# Patient Record
Sex: FEMALE | Race: WHITE
[De-identification: names, ages, dates, MRNs, and addresses within clinical notes are randomized per-mention and may not be internally consistent; named-entity substitution may affect disease eponyms.]

---

## 2017-02-20 ENCOUNTER — HOSPITAL ENCOUNTER (OUTPATIENT)
Dept: HOSPITAL 45 - C.MRI | Age: 67
Discharge: HOME | End: 2017-02-20
Attending: ORTHOPAEDIC SURGERY
Payer: COMMERCIAL

## 2017-02-20 DIAGNOSIS — M75.101: ICD-10-CM
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
## 2017-02-20 DIAGNOSIS — S43.431A: Primary | ICD-10-CM

## 2017-02-20 DIAGNOSIS — M75.21: ICD-10-CM

## 2017-02-20 NOTE — DIAGNOSTIC IMAGING REPORT
RIGHT SHOULDER MRI



HISTORY:      RIGHT SHOULDER PAIN, BICEP TENDINITIS

Right



TECHNIQUE: Multiplanar multisequence MRI of the right shoulder was performed

without contrast.



COMPARISON STUDY:  Right shoulder MRI 12/10/2015.



FINDINGS: 



AC joint: Partially obscured by metallic artifact but appears to be grossly

intact.



Rotator cuff: Abnormal signal within the distal attachment of the supraspinatus

tendon consistent with a partial bursal surface tear. Best seen on coronal image

7 there is a linear full-thickness tear within the mid to distal supraspinatus

tendon. There is also a small area of increased signal within the distal

subscapularis tendon consistent with a partial tear. The infraspinatus and teres

minor tendons appear intact. Mild fatty atrophy of supraspinatus muscle. No

associated retraction. Trace fluid within the subacromial/subdeltoid bursa.



Labrum: Abnormal signal and configuration within the superior labrum consistent

with a SLAP tear.



Biceps tendon: Abnormal intrasubstance signal proximally consistent with a mild

tendinopathy.



Bones: No fracture or dislocation. Small amount of cystic change within the

posterior lateral humeral head.



Cartilage: Intact



Miscellaneous: No significant joint effusion.



IMPRESSION:  



1. Small linear full-thickness tear involving the mid to distal supraspinatus

tendon. There is also a partial bursal surface tear at the distal attachment of

the supraspinatus tendon.

2. Small partial tear of the distal subscapularis tendon.

3. SLAP tear.

4. Mild proximal biceps tendinopathy. 







Electronically signed by:  Monty Friend M.D.

2/20/2017 2:47 PM



Dictated Date/Time:  2/20/2017 2:38 PM

## 2017-02-27 ENCOUNTER — HOSPITAL ENCOUNTER (OUTPATIENT)
Dept: HOSPITAL 45 - C.RAD | Age: 67
Discharge: HOME | End: 2017-02-27
Attending: ORTHOPAEDIC SURGERY
Payer: COMMERCIAL

## 2017-02-27 DIAGNOSIS — Z01.818: Primary | ICD-10-CM

## 2017-02-27 DIAGNOSIS — M75.21: ICD-10-CM

## 2017-02-27 LAB
ANION GAP SERPL CALC-SCNC: 8 MMOL/L (ref 3–11)
BASOPHILS # BLD: 0.02 K/UL (ref 0–0.2)
BASOPHILS NFR BLD: 0.3 %
BUN SERPL-MCNC: 18 MG/DL (ref 7–18)
BUN/CREAT SERPL: 21.3 (ref 10–20)
CALCIUM SERPL-MCNC: 8.9 MG/DL (ref 8.5–10.1)
CHLORIDE SERPL-SCNC: 99 MMOL/L (ref 98–107)
CO2 SERPL-SCNC: 31 MMOL/L (ref 21–32)
COMPLETE: YES
CREAT SERPL-MCNC: 0.85 MG/DL (ref 0.6–1.2)
EOSINOPHIL NFR BLD AUTO: 298 K/UL (ref 130–400)
GLUCOSE SERPL-MCNC: 87 MG/DL (ref 70–99)
HCT VFR BLD CALC: 41.3 % (ref 37–47)
IG%: 0.2 %
IMM GRANULOCYTES NFR BLD AUTO: 29.8 %
LYMPHOCYTES # BLD: 1.91 K/UL (ref 1.2–3.4)
MCH RBC QN AUTO: 32.4 PG (ref 25–34)
MCHC RBC AUTO-ENTMCNC: 34.4 G/DL (ref 32–36)
MCV RBC AUTO: 94.3 FL (ref 80–100)
MONOCYTES NFR BLD: 10.9 %
NEUTROPHILS # BLD AUTO: 0.8 %
NEUTROPHILS NFR BLD AUTO: 58 %
PMV BLD AUTO: 9.4 FL (ref 7.4–10.4)
POTASSIUM SERPL-SCNC: 3.8 MMOL/L (ref 3.5–5.1)
RBC # BLD AUTO: 4.38 M/UL (ref 4.2–5.4)
SODIUM SERPL-SCNC: 138 MMOL/L (ref 136–145)
WBC # BLD AUTO: 6.42 K/UL (ref 4.8–10.8)

## 2017-02-27 NOTE — DIAGNOSTIC IMAGING REPORT
CHEST 2 VIEWS ROUTINE



CLINICAL HISTORY: M75.21 BICEPS TENDINITIS preoperative evaluation



COMPARISON STUDY:  5/5/2015



FINDINGS: The bones soft tissues and hemidiaphragms are normal. The

cardiomediastinal silhouette is normal. The lungs are clear. The pulmonary

vasculature is normal. 



IMPRESSION:  Negative chest. 









Electronically signed by:  Toro Villarreal M.D.

2/27/2017 3:32 PM



Dictated Date/Time:  2/27/2017 3:31 PM

## 2017-03-23 ENCOUNTER — HOSPITAL ENCOUNTER (OUTPATIENT)
Dept: HOSPITAL 45 - X.SURG | Age: 67
Discharge: HOME | End: 2017-03-23
Attending: ORTHOPAEDIC SURGERY
Payer: COMMERCIAL

## 2017-03-23 VITALS
WEIGHT: 116.25 LBS | HEIGHT: 60.98 IN | WEIGHT: 116.25 LBS | HEIGHT: 60.98 IN | BODY MASS INDEX: 21.95 KG/M2 | BODY MASS INDEX: 21.95 KG/M2

## 2017-03-23 VITALS — SYSTOLIC BLOOD PRESSURE: 133 MMHG | DIASTOLIC BLOOD PRESSURE: 71 MMHG | OXYGEN SATURATION: 99 %

## 2017-03-23 VITALS — TEMPERATURE: 97.16 F

## 2017-03-23 DIAGNOSIS — Z88.1: ICD-10-CM

## 2017-03-23 DIAGNOSIS — Z98.890: ICD-10-CM

## 2017-03-23 DIAGNOSIS — F17.210: ICD-10-CM

## 2017-03-23 DIAGNOSIS — M75.101: Primary | ICD-10-CM

## 2017-03-23 DIAGNOSIS — K21.9: ICD-10-CM

## 2017-03-23 DIAGNOSIS — M75.21: ICD-10-CM

## 2017-03-23 DIAGNOSIS — Z88.2: ICD-10-CM

## 2017-03-23 DIAGNOSIS — Z98.51: ICD-10-CM

## 2017-03-23 NOTE — OPERATIVE REPORT
DATE OF OPERATION:  03/23/2017

 

PREOPERATIVE DIAGNOSIS:  Partial thickness cuff tear of the right shoulder 1

year status post right shoulder arthroscopy.

 

POSTOPERATIVE DIAGNOSIS:  Same.

 

PROCEDURE:  Right shoulder diagnostic arthroscopy with extensive debridement

including removal of scar tissue, acromioplasty, small far anterior rotator

cuff repair and biceps tenotomy.

 

SURGEON:  Dr. Leroy Jaime.

 

ASSISTANT:  Armin Gomez PA-C, whose assistance was necessary for positioning

of the arm and helping with instrumentation.

 

ANESTHESIA:  General with a right interscalene nerve block.

 

COMPLICATIONS:  None.

 

CONDITION:  Stable to PACU.

 

INDICATIONS:  Sanna is a pleasant 66-year-old female who underwent a right

shoulder arthroscopy at an outside institution a year ago.  She had a simple

acromioplasty at that time.  Unfortunately, she never did better after

surgery and continued to have pain.  Repeat MRI showed a high grade partial

thickness cuff tear and biceps tendinopathy.  After failing conservative

treatment, she elected to undergo arthroscopy.

 

DESCRIPTION OF PROCEDURE:  On 03/23/2017, she arrived at [facility] for the above procedure.  She was seen in the preoperative

holding area and the operative extremity was identified and signed.  She was

given a preoperative antibiotic and a right interscalene nerve block.  She

was taken back to the operating room, laid on the table in supine position

and put under general anesthesia.  She was then put into the beachchair

position.  The right shoulder was prepped and draped in sterile fashion. 

Time-out was done and the patient and operative extremity was properly

identified.

 

A scope was introduced in the posterior portal.  Diagnostic arthroscopy

showed no cartilage damage to the humeral head or the glenoid.  There was

some fraying of the anterior labrum.  There was some tightness of the rotator

interval.  The biceps tendon went through an enlarged biceps pulley mechanism

and was read on the dorsal aspect.  There was a high grade partial thickness

cuff tear of the far anterior supraspinatus and the remaining tissue did not

look very good.  The subscapularis, infraspinatus and teres minor were all

checked and intact.  An anterior portal was made, a shaver was used to do a

limited debridement of the intraarticular structures and the biceps tendon

was arthroscopically tenotomized.  The scope was put into the subacromial

space.  A lateral portal was made.  A shaver was used to do a complete

subacromial and subdeltoid bursectomy.  An ablator was used to tease the

coracoacromial ligament off the undersurface of the acromion and a shaver was

used to do a revision acromioplasty to smooth out the undersurface of the

acromion.  Extensive debridement was done all the scar tissue in the

subacromial space and hemostasis was controlled with an ablator.  The bursal

side of the rotator cuff was examined extensively.  The rotator cuff tear was

easily completed.  An additional anterolateral portal was made and Lea

cannula was placed.  The greater tuberosity was prepared with a ring curette

and a microfracture.  The rotator cuff was fixed with an Arthrex modified

SpeedBridge configuration with a single 4.75 mm BioComposite SwiveLock suture

anchor, placed along the medial row, 2 Fiber tapes were passed through the

tendon brought down to a single lateral row SwiveLock suture anchor.  This

gave a nice knotless SpeedBridge repair.  A Fiber link was attached to the

anterior leading edge of the tear and brought to do a single 4.75 mm

BioComposite SwiveLock suture anchor, which was placed in the area of the

bicipital groove.  This gave an anatomical fixation.  Multiple pictures were

taken.  The scope was placed back into the glenohumeral joint and the

articular margin of the rotator cuff had been restored.  Multiple pictures

were taken.  Arthroscopic instruments were removed from the shoulder.  Portal

sites were closed with 3-0 nylon.  She was then placed in a soft dressing and

an abduction arm sling.  She was then extubated, transferred to a litter and

taken to the postanesthesia care unit in stable condition.  She tolerated the

procedure well.

 

 

I attest to the content of the Intraoperative Record and any orders documented therein. Any exceptio
ns are noted below.

## 2017-03-23 NOTE — [facility] POST OPERATIVE BRIEF NOTE
Immediate Operative Summary


Operative Date


Mar 23, 2017.





Pre-Operative Diagnosis





Right Shoulder Biceps Tendinitis





Post-Operative Diagnosis





Same





Procedure(s) Performed





Right Shoulder Arthroscopy, Small Rotator Cuff Repair, Extensive Debridement, 


Biceps Tenotomy





Surgeon


Dr. Jaime





Assistant Surgeon(s)


AMANDA Gomez PA-C





Estimated Blood Loss


Minimal





Findings


as above





Specimens





None





Complication(s)


None





Disposition


Recovery Room / PACU

## 2017-03-23 NOTE — DISCHARGE INSTRUCTIONS-[facility]
Discharge Instructions


Date of Service


Mar 23, 2017.





Visit


Reason for Visit:  Right Shoulder Biceps Tendinitis





Discharge


Discharge Diagnosis / Problem:  SAME AS ABOVE





Discharge Goals


Goal(s):  Decrease discomfort, Improve function





Medications


Stopped Medications Name(s):  


HELD IBUPROFEN FOR ONE WEEK


Restart Stopped Medication(s):


MAY RESTART ONCE FINISHED WITH THE TORADOL





Activity Recommendations


Activity Limitations:  as noted below


Lifting Limitations:  until after follow-up appointment


Exercise/Sports Limitations:  until after follow-up appointment


Shower/Bathe:  tomorrow





Anesthesia


.





Post Anesthesia Instructions:





If you have had General Anesthesia or IV Sedation:





*  Do not drive today.


*  Resume driving when surgeon permits.


*  Do not make important decisions or sign legal documents today.


*  Call surgeon for:





   1.  Temperature elevations greater than 101 degrees F.


   2.  Uncontrollable pain.


   3.  Excessive bleeding.


   4.  Persistent nausea and vomiting.


   5.  Medication intolerance (nausea, vomiting or rash).





*  For nausea and vomiting use only clear liquids such as: tea, soda, bouillon 

until nausea subsides, then gradually increase diet as tolerated.





*  If you have any concerns or questions, call your surgeon's office.  If 

physician is unavailable and it is an emergency, call 911 or go to the nearest 

emergency room.





.





Instructions / Follow-Up


Instructions / Follow-Up





MEDICATIONS:





*  Resume previous medications unless instructed otherwise by your surgeon.





*  Always take pain medication on a full stomach or with food to avoid upset 

stomach. 





*  Do not drink alcohol or drive while taking narcotics.





*  Ibuprofen or Tylenol may be taken if narcotic not needed.








SPECIAL CARE INSTRUCTIONS:





__ None





_X_ Keep extremity elevated and iced x 48 hours; apply ice 20-30 


    minutes 8-10 times/day. May remove at night.





_X_ Sling (MAY REMOVE AFTER 48 HOURS ONLY TO SHOWER AND FOR THERAPY) 


    _X_24 hrs/day


    __ Remove at night





__ Shoulder Immobilizer


    __ 24 hrs/day


    __ Remove at night





_X_ Dressing


    __ Maintain until seen in office, may shower with plastic over site


    _X_ Remove dressings in 24-48 hours and then may shower


    _X_ Cover incisions with band-aids after showering


    __ Do not remove steri-strips





Call physician if chills or temperature rises above 102 degrees or pain


unrelieved by prescribed pain medications at [phone].


.





Diet Recommendations


Home Diet:  no limitations


Fluid Restriction:  None





Procedures


Procedures Performed:  


Right Shoulder Arthroscopy, Small Rotator Cuff Repair, Extensive Debridement, 


Biceps Tenotomy





Pending Studies


Studies pending at discharge:  no





Work Instructions


Return To Work:  after follow-up


Lifting Limitations:  NO LIFTING WITH RIGHT ARM





Medical Emergencies








.


Who to Call and When:





Medical Emergencies:  If at any time you feel your situation is an emergency, 

please call 911 immediately.





.





Non-Emergent Contact


Non-Emergency issues call your:  Primary Care Provider


Call Non-Emergent contact if:  you have a fever, temperature is above 101, 

temperature is above 101.5





.


.





"Provider Documentation" section prepared by Abhi Gomez.

## 2017-03-23 NOTE — ANESTHESIA PROGRESS NT - [facility]
Anesthesia Post Op Note


Date & Time


Mar 23, 2017 at 14:29





Vital Signs


Pain Intensity:  0





 Vital Signs Past 12 Hours








  Date Time  Temp Pulse Resp B/P Pulse Ox O2 Delivery O2 Flow Rate FiO2


 


3/23/17 14:19 36.2 83 18 138/72 98 Room Air  


 


3/23/17 13:46  81 14     


 


3/23/17 13:46  79 14  98   


 


3/23/17 13:44    153/90    


 


3/23/17 13:42 36.1 81 20 153/90 98 Room Air  


 


3/23/17 13:41  81 18     


 


3/23/17 13:41  81 18 143/82 100   


 


3/23/17 13:40    138/68    


 


3/23/17 13:36  75 13  100   


 


3/23/17 13:36  76 13     


 


3/23/17 13:35    130/68    


 


3/23/17 13:31  82 15  100   


 


3/23/17 13:31  81 15     


 


3/23/17 13:30    132/69    


 


3/23/17 13:26  74 15     


 


3/23/17 13:26  74 15  100   


 


3/23/17 13:25    137/71    


 


3/23/17 13:21  73 16     


 


3/23/17 13:21  74 16  100   


 


3/23/17 13:20    132/64    


 


3/23/17 13:16  74 15  100   


 


3/23/17 13:16  74 15     


 


3/23/17 13:15    134/69    


 


3/23/17 13:11  78 27  100   


 


3/23/17 13:11  78 27     


 


3/23/17 13:10    132/73    


 


3/23/17 13:06  76 17     


 


3/23/17 13:06  75 17  100   


 


3/23/17 13:05    136/64    


 


3/23/17 13:03 36.3 77 16 145/64 95 Diffusion Mask 6 


 


3/23/17 13:02    145/64    


 


3/23/17 11:56   0     


 


3/23/17 11:51   0     


 


3/23/17 11:50   0     


 


3/23/17 11:45   0     


 


3/23/17 11:40  76  122/70 100   


 


3/23/17 11:40  76      


 


3/23/17 11:35  73 0 130/66 97   


 


3/23/17 11:35  74      


 


3/23/17 11:30  74 0  98   


 


3/23/17 11:30  73      


 


3/23/17 11:25  74      


 


3/23/17 11:25  74 0  98   


 


3/23/17 10:19 37.9 83 16 114/83 98 Room Air  











Notes


Mental Status:  alert / awake / arousable, participated in evaluation


Pt Amnestic to Procedure:  Yes


Nausea / Vomiting:  adequately controlled


Pain:  adequately controlled


Airway Patency, RR, SpO2:  stable & adequate


BP & HR:  stable & adequate


Hydration State:  stable & adequate


Anesthetic Complications:  no major complications apparent

## 2017-12-14 ENCOUNTER — HOSPITAL ENCOUNTER (OUTPATIENT)
Dept: HOSPITAL 45 - C.MAMM | Age: 67
Discharge: HOME | End: 2017-12-14
Attending: FAMILY MEDICINE
Payer: COMMERCIAL

## 2017-12-14 DIAGNOSIS — Z12.31: Primary | ICD-10-CM

## 2017-12-15 NOTE — MAMMOGRAPHY REPORT
BILATERAL DIGITAL SCREENING MAMMOGRAM TOMOSYNTHESIS WITH CAD: 12/14/2017

CLINICAL HISTORY: Routine screening.  Patient has no complaints.  





TECHNIQUE:  Breast tomosynthesis in addition to standard 2D mammography was performed. Current study 
was also evaluated with a Computer Aided Detection (CAD) system.  



COMPARISON: Comparison is made to exams dated:  11/10/2016 mammogram, 11/9/2015 mammogram, 9/11/2014 
mammogram, 9/5/2013 mammogram, 9/4/2012 mammogram, and 9/1/2011 mammogram - [facility].   



BREAST COMPOSITION:  The tissue of both breasts is almost entirely fatty.  



FINDINGS:  No suspicious masses, calcifications, or areas of architectural distortion are noted in ei
ther breast. There has been no significant interval change compared to prior exams. Scattered bilater
al benign-appearing calcifications are not significantly changed.  





IMPRESSION:  ACR BI-RADS CATEGORY 2: BENIGN

There is no mammographic evidence of malignancy. A 1 year screening mammogram is recommended.  The pa
tient will receive written notification of the results.  





Approximately 10% of breast cancers are not detected with mammography. A negative mammographic report
 should not delay biopsy if a clinically suggestive mass is present.



Cori Pulliam M.D.          

/:12/14/2017 14:40:14  



Imaging Technologist: Aye MEDRANO(LAURA)(M), [facility]

letter sent: Normal 1/2  

BI-RADS Code: ACR BI-RADS Category 2: Benign

## 2018-02-16 ENCOUNTER — HOSPITAL ENCOUNTER (EMERGENCY)
Dept: HOSPITAL 45 - C.EDB | Age: 68
Discharge: HOME | End: 2018-02-16
Payer: COMMERCIAL

## 2018-02-16 VITALS
HEIGHT: 60.98 IN | BODY MASS INDEX: 22.89 KG/M2 | HEIGHT: 60.98 IN | BODY MASS INDEX: 22.89 KG/M2 | WEIGHT: 121.25 LBS | WEIGHT: 121.25 LBS

## 2018-02-16 VITALS — TEMPERATURE: 97.7 F

## 2018-02-16 VITALS — HEART RATE: 60 BPM | SYSTOLIC BLOOD PRESSURE: 133 MMHG | OXYGEN SATURATION: 98 % | DIASTOLIC BLOOD PRESSURE: 70 MMHG

## 2018-02-16 DIAGNOSIS — Z87.891: ICD-10-CM

## 2018-02-16 DIAGNOSIS — M79.602: Primary | ICD-10-CM

## 2018-02-16 DIAGNOSIS — M54.12: ICD-10-CM

## 2018-02-16 LAB
BASOPHILS # BLD: 0.01 K/UL (ref 0–0.2)
BASOPHILS NFR BLD: 0.2 %
BUN SERPL-MCNC: 19 MG/DL (ref 7–18)
CALCIUM SERPL-MCNC: 9.5 MG/DL (ref 8.5–10.1)
CO2 SERPL-SCNC: 29 MMOL/L (ref 21–32)
CREAT SERPL-MCNC: 0.94 MG/DL (ref 0.6–1.2)
EOS ABS #: 0.03 K/UL (ref 0–0.5)
EOSINOPHIL NFR BLD AUTO: 265 K/UL (ref 130–400)
GLUCOSE SERPL-MCNC: 65 MG/DL (ref 70–99)
HCT VFR BLD CALC: 39.2 % (ref 37–47)
HGB BLD-MCNC: 13.3 G/DL (ref 12–16)
IG#: 0 K/UL (ref 0–0.02)
IMM GRANULOCYTES NFR BLD AUTO: 40.2 %
LYMPHOCYTES # BLD: 2 K/UL (ref 1.2–3.4)
MCH RBC QN AUTO: 32.4 PG (ref 25–34)
MCHC RBC AUTO-ENTMCNC: 33.9 G/DL (ref 32–36)
MCV RBC AUTO: 95.6 FL (ref 80–100)
MONO ABS #: 0.57 K/UL (ref 0.11–0.59)
MONOCYTES NFR BLD: 11.5 %
NEUT ABS #: 2.36 K/UL (ref 1.4–6.5)
NEUTROPHILS # BLD AUTO: 0.6 %
NEUTROPHILS NFR BLD AUTO: 47.5 %
PMV BLD AUTO: 8.7 FL (ref 7.4–10.4)
POTASSIUM SERPL-SCNC: 4.2 MMOL/L (ref 3.5–5.1)
RED CELL DISTRIBUTION WIDTH CV: 13.2 % (ref 11.5–14.5)
RED CELL DISTRIBUTION WIDTH SD: 45.8 FL (ref 36.4–46.3)
SODIUM SERPL-SCNC: 140 MMOL/L (ref 136–145)
WBC # BLD AUTO: 4.97 K/UL (ref 4.8–10.8)

## 2018-02-16 NOTE — EMERGENCY ROOM VISIT NOTE
ED Visit Note


First contact with patient:  16:23


This Patient was discussed with the physician assistant, Tho Cabrales PA-C.  

The pertinent historical and physical exam findings were confirmed.  I agree 

with the studies ordered and with the interpretations of these studies.  I 

agree with the disposition and care plan.

## 2018-02-16 NOTE — DIAGNOSTIC IMAGING REPORT
CERVICAL SPINE 5 VIEWS



HISTORY:      Neck pain with pain down L arm



COMPARISON: None.



FINDINGS: The cervical spine is visualized from C1 through the superior endplate

of T1. There is no fracture.  There is 1.5 mm of anterolisthesis of C3 on C4.

Moderate facet osteoarthritis at C3-C4 and C7-T1. Disc spaces are preserved.

Prevertebral soft tissues and the atlantodens interval are intact. No

significant neural foraminal narrowing. The lung apices are clear.



IMPRESSION:  



1. No fractures within the cervical spine.

2. C3-C4 and C7-T1 moderate facet osteoarthritis.

3. Minimal anterolisthesis of C3 on C4.







Electronically signed by:  Monty Friend M.D.

2/16/2018 6:24 PM



Dictated Date/Time:  2/16/2018 6:20 PM

## 2018-02-16 NOTE — DIAGNOSTIC IMAGING REPORT
LEFT UPPER EXTREMITY VENOUS DOPPLER



HISTORY:      L arm pain



COMPARISON STUDY:  None.



FINDINGS: The left internal jugular vein is patent. There is normal flow within

the left subclavian vein. There is normal flow and compressibility within the

left axillary, basilic, brachial, radial, ulnar, and visualized cephalic veins. 



IMPRESSION:  

No DVT within the left upper extremity. 







Electronically signed by:  Monty Friend M.D.

2/16/2018 6:08 PM



Dictated Date/Time:  2/16/2018 6:08 PM

## 2018-02-16 NOTE — EMERGENCY ROOM VISIT NOTE
History


First contact with patient:  16:23


Chief Complaint:  ARM PAIN


Stated Complaint:  PAIN DOWN MY LEFT ARM





History of Present Illness


The patient is a 67 year old female who presents to the Emergency Room via 

private vehicle with complaints of "pain down my left arm".  The patient notes 

that she has been experiencing left arm pain radiating from her left neck down 

to her fingertips 1 month.  There is no known injury.  She states that she saw 

her family doctor this past week and was informed this is likely a pinched nerve

, and has a scheduled follow-up with Dr. Chao of neurology in about a month 

and a half.  She notes that now the pain is worsening, she could not sleep last 

night and decided to be evaluated.  She denies with this any fevers, chills, 

chest pain or shortness of breath.  She does note associated headaches from 

time to time.





Review of Systems


A complete 6-point Review of Systems was discussed with the patient, with 

pertinent positives and negatives listed in the History of Present Illness. All 

remaining Review of Systems questions can be considered negative unless 

otherwise specified.





Past Medical/Surgical History


Medical Problems:


(1) Chronic Sinusitis Nos


(2) Diverticulosis Colon (W/O Ment Of Hemorrhage)


(3) Lumbago


(4) Ovarian Cyst Nec/Nos


(5) Overactive bladder








Family History





No pertinent family history





Social History


Smoking Status:  Former Smoker


Alcohol Use:  occasionally


Marital Status:  


Occupation Status:  retired





Current/Historical Medications


Scheduled


Ascorbic Acid (Ascorbic Acid), 500 MG PO QAM


Cholecalciferol (D 2000), 1 TAB PO QAM


Fluticasone Propionate (Nasal) (Flonase Allergy Relief), 2 SPRAYS MCKAYLA QAM


Glucosamine-Msm-Vit C-Manganes (Glucosamine Msm Complex), 1 TAB PO QAM


Magnesium Oxide (Mag-Ox), 400 MG PO DAILY


Methylprednisolone (Medrol Dosepak), 0 PO DAILY


Montelukast Sodium (Singulair), 10 MG PO HS


Multivitamin (Multivitamin), 1 TAB PO QAM


Omeprazole (Prilosec), 40 MG PO QAM


Oxybutynin Chloride (Oxybutynin Chloride Er), 5 MG PO DAILY


Potassium Gluconate (Potassium Gluconate), 1 TAB PO QAM


Riboflavin (Riboflavin), 400 MG PO DAILY





Scheduled PRN


Acetaminophen (Tylenol), 1,000 MG PO Q6 PRN for Pain


Calcium Carbonate (Tums), 1 TAB PO DAILY PRN for Indigestion


Ibuprofen Tab (Motrin), 800 MG PO DAILY PRN for Pain


Lorazepam (Ativan), 1 MG PO BID PRN for Anxiety


Sumatriptan Succinate (Imitrex), 1 TAB PO UD PRN for Migraine


Tramadol (Ultram), 1 TAB PO TID PRN for pain





Physical Exam


Vital Signs











  Date Time  Temp Pulse Resp B/P (MAP) Pulse Ox O2 Delivery O2 Flow Rate FiO2


 


2/16/18 18:37  60 16 133/70 98 Room Air  


 


2/16/18 16:30  72 16 145/75 96 Room Air  


 


2/16/18 14:31 36.5 87 16 121/70 95 Room Air  











Physical Exam


VITAL SIGNS - Vital signs and nursing notes were reviewed.  Stable.


GENERAL -67-year-old female appearing her stated age who is in no acute 

distress. Communicates well with provider and answers questions appropriately.


SKIN - Without rashes.  No petechial rashes.


NECK - Neck with FROM.  No meningismus.  There is superior trapezius tenderness 

noted to the left.


LUNGS - Chest wall symmetric without accessory muscle use, intercostals 

retractions, or central cyanosis. Normal vesicular breath sounds CTA B/L. No 

wheezes, rales, or rhonchi appreciated.


CARDIAC - RRR with S1/S2. No murmur, rubs, or gallops appreciated. 


EXTREMITIES - No clubbing or peripheral cyanosis. No pretibial edema present.  

She is neurovascularly intact in left upper extremity.  Tenderness to palpation 

overlying the proximal left arm.  There is also tenderness in the left superior 

trapezius muscle.  Excellent reflex noted in the biceps region of the left arm.

   strength intact.  No neurovascular deficits appreciated in left upper 

extremity.





Medical Decision & Procedures


ER Provider


Diagnostic Interpretation:


CERVICAL SPINE 5 VIEWS





HISTORY:      Neck pain with pain down L arm





COMPARISON: None.





FINDINGS: The cervical spine is visualized from C1 through the superior endplate


of T1. There is no fracture.  There is 1.5 mm of anterolisthesis of C3 on C4.


Moderate facet osteoarthritis at C3-C4 and C7-T1. Disc spaces are preserved.


Prevertebral soft tissues and the atlantodens interval are intact. No


significant neural foraminal narrowing. The lung apices are clear.





IMPRESSION:  





1. No fractures within the cervical spine.


2. C3-C4 and C7-T1 moderate facet osteoarthritis.


3. Minimal anterolisthesis of C3 on C4.











Electronically signed by:  Monty Friend M.D.


2/16/2018 6:24 PM





Dictated Date/Time:  2/16/2018 6:20 PM





Laboratory Results


2/16/18 16:39








Red Blood Count 4.10, Mean Corpuscular Volume 95.6, Mean Corpuscular Hemoglobin 

32.4, Mean Corpuscular Hemoglobin Concent 33.9, Mean Platelet Volume 8.7, 

Neutrophils (%) (Auto) 47.5, Lymphocytes (%) (Auto) 40.2, Monocytes (%) (Auto) 

11.5, Eosinophils (%) (Auto) 0.6, Basophils (%) (Auto) 0.2, Neutrophils # (Auto

) 2.36, Lymphocytes # (Auto) 2.00, Monocytes # (Auto) 0.57, Eosinophils # (Auto

) 0.03, Basophils # (Auto) 0.01





2/16/18 16:39

















Test


  2/16/18


16:39


 


White Blood Count


  4.97 K/uL


(4.8-10.8)


 


Red Blood Count


  4.10 M/uL


(4.2-5.4)


 


Hemoglobin


  13.3 g/dL


(12.0-16.0)


 


Hematocrit 39.2 % (37-47) 


 


Mean Corpuscular Volume


  95.6 fL


()


 


Mean Corpuscular Hemoglobin


  32.4 pg


(25-34)


 


Mean Corpuscular Hemoglobin


Concent 33.9 g/dl


(32-36)


 


Platelet Count


  265 K/uL


(130-400)


 


Mean Platelet Volume


  8.7 fL


(7.4-10.4)


 


Neutrophils (%) (Auto) 47.5 % 


 


Lymphocytes (%) (Auto) 40.2 % 


 


Monocytes (%) (Auto) 11.5 % 


 


Eosinophils (%) (Auto) 0.6 % 


 


Basophils (%) (Auto) 0.2 % 


 


Neutrophils # (Auto)


  2.36 K/uL


(1.4-6.5)


 


Lymphocytes # (Auto)


  2.00 K/uL


(1.2-3.4)


 


Monocytes # (Auto)


  0.57 K/uL


(0.11-0.59)


 


Eosinophils # (Auto)


  0.03 K/uL


(0-0.5)


 


Basophils # (Auto)


  0.01 K/uL


(0-0.2)


 


RDW Standard Deviation


  45.8 fL


(36.4-46.3)


 


RDW Coefficient of Variation


  13.2 %


(11.5-14.5)


 


Immature Granulocyte % (Auto) 0.0 % 


 


Immature Granulocyte # (Auto)


  0.00 K/uL


(0.00-0.02)


 


Erythrocyte Sedimentation Rate 3 mm/hr (0-21) 


 


Anion Gap


  7.0 mmol/L


(3-11)


 


Est Creatinine Clear Calc


Drug Dose 43.8 ml/min 


 


 


Estimated GFR (


American) 72.8 


 


 


Estimated GFR (Non-


American 62.8 


 


 


BUN/Creatinine Ratio 20.2 (10-20) 


 


Calcium Level


  9.5 mg/dl


(8.5-10.1)


 


Troponin I


  < 0.015 ng/ml


(0-0.045)


 


C-Reactive Protein


  < 0.29 mg/dl


(0-0.29)











Medications Administered











 Medications


  (Trade)  Dose


 Ordered  Sig/Ketan


 Route  Start Time


 Stop Time Status Last Admin


Dose Admin


 


 Morphine Sulfate


  (MoRPHine


 SULFATE INJ)  4 mg  NOW  STAT


 IV  2/16/18 16:29


 2/16/18 16:31 DC 2/16/18 16:50


4 MG


 


 Ondansetron HCl


  (Zofran Inj)  4 mg  NOW  STAT


 IV  2/16/18 16:29


 2/16/18 16:31 DC 2/16/18 16:50


4 MG


 


 Tramadol HCl


  (Ultram Home


 Pack)  1 homepack  UD  STAT


 PO  2/16/18 18:49


 2/16/18 18:51 DC 2/16/18 19:03


1 HOMEPACK


 


 Dexamethasone


 Sodium Phosphate


  (Dexamethasone


 Inj **Pf**)  10 mg  STK-MED ONCE


 .ROUTE  2/16/18 18:59


 2/16/18 19:00 DC 2/16/18 19:03


10 MG











Medical Decision


Patient was seen and evaluated as above.  She presents to us today with pain 

traveling from the left side of her neck down to the left fingers.  This is not 

worse with exertion.  I do not suspect any cardiac etiology.  It is 

reproducible on exam.  I favor she likely is experiencing cervical 

radiculopathy.  Vital signs are stable.  X-ray was obtained of the cervical 

spine and ultrasound was obtained of the left upper extremity to rule out DVT.  

There is no upper extremity DVT.  X-ray does reveal some degenerative change 

with certainty can contribute.  I do not believe that at this time an MRI is 

warranted in the emergency department setting.  I believe the treatment with 

steroids, and pain medication is appropriate with close follow-up with the 

family doctor  and with a spine specialist and perhaps neurology.  Case was 

also discussed with the attending physician who also personally evaluated the 

patient.  She will be discharged home on a Medrol Dosepak after an IV dose of 

steroids here today, as well as tramadol.  She has many allergies listed, has 

never had tramadol and I cautioned her that if she experiences a reaction she 

is to return.  While here she was given morphine for pain.  Her pain was 

alleviated.  She was educated that she likely is experiencing a small bulging 

disc in the neck/pinched nerve.  I informed her that this time she appears 

stable for outpatient management.  She was educated upon management, educated 

upon worrisome symptoms in which to return, had questions answered prior to 

discharge, and was discharged home in good condition.





Medication list reviewed.





Blood pressure found to be only slightly elevated I believe secondary to 

situation.





In the evaluation and treatment of this patient, the following differential 

diagnoses were considered: Musculoskeletal Strain, Discitis, Cervical Spine 

Fracture, Cervical Spine Dislocation, Cervical Spine Subluxation, Cervical 

Spondylosis, Fibromyalgia, Osteoarthritis, Polymyalgia Rheumatica, Psychogenic 

Pain Disorder, Tumor of Soft Tissue or Spine.





Impression





 Primary Impression:  


 Arm pain, left


 Additional Impression:  


 Cervical radiculopathy





Departure Information


Dispostion


Home / Self-Care





Condition


GOOD





Prescriptions





Methylprednisolone (MEDROL DOSEPAK) 4 Mg Dewayne


0 PO DAILY, #1 PKT


   Prov: Tho Cabrales PA-C         2/16/18 


Tramadol (Ultram) 50 Mg Tab


1 TAB PO TID Y for pain , #9 TAB


   for initial treatment


   Prov: Tho Cabrales PA-C         2/16/18





Referrals


Federico Diallo M.D. (PCP)








Rohan Caballero, DO





Patient Instructions


My [facility]





Additional Instructions





You have been treated in the Emergency Department for neck Pain. You have 

received pain medicine in the emergency department which impairs your ability 

to operate a vehicle. It is illegal for you to drive after receiving these 

medicines. 





You have been prescribed tramadol to be used for pain control. This is a 

narcotic medication. You cannot drive or consume alcohol while on this 

medicine. This medicine should only be used for pain that cannot be controlled 

with over-the-counter pain medicines.





You have been prescribed a Medrol Dosepak. Take this medication as prescribed. 

You should take the COMPLETE 6-day course of this medication. This is an anti-

inflammatory medicine that will help to minimize your symptoms.





For pain control, you can use the following over-the-counter medicines (if >13 yo):





- Regular strength (325mg/tab) Tylenol (acetaminophen) 2 tabs every 4-6 hours 

as needed. Do not exceed 12 tablets in a 24 hour period. Avoid taking more than 

3 grams (3000 mg) of Tylenol per day. This includes any other sources of 

acetaminophen you may take on a regular basis.





- Regular strength (200 mg/tab) Advil (ibuprofen) 1-2 tabs every 4-6 hours as 

needed. Do not exceed a dose of 3200 mg per day.





If this is an acute injury, ice can be applied to the area of pain for the 

first 3 days to help decrease pain and inflammation. After the first 3 days, a 

heating pad can be used over the area for continued soothing relief.





You should schedule a follow-up appointment in 2-3 days with your Primary Care 

Provider and Dr. Caballero, spine specialist for further evaluation and treatment 

of your neck pain.





Return to the Emergency Department if your current symptoms worsen despite 

treatment course outlined above, or if you develop any of the following symptoms

: intractable pain despite aforementioned treatment course, loss of control of 

your bowel or bladder, numbness or tingling in your groin, or development of a 

fever.





Problem Qualifiers

## 2018-03-05 ENCOUNTER — HOSPITAL ENCOUNTER (OUTPATIENT)
Dept: HOSPITAL 45 - C.MRI | Age: 68
Discharge: HOME | End: 2018-03-05
Attending: ORTHOPAEDIC SURGERY
Payer: COMMERCIAL

## 2018-03-05 DIAGNOSIS — M50.20: Primary | ICD-10-CM

## 2018-03-05 NOTE — DIAGNOSTIC IMAGING REPORT
MRI CERVICAL WITHOUT CONTRAST



CLINICAL HISTORY: Neck pain with left arm radiculopathy. Headaches.    



TECHNIQUE: Sagittal and axial T1, T2 and STIR images were obtained. 



COMPARISON STUDY:  Conventional radiographic study dated 2/16/2018 



There are no suspicious areas of marrow replacement. No intrinsic cervical cord

lesions are visualized.



C2-3: There is no evidence of disc bulge or focal herniation. There is no spinal

or foraminal stenosis.

C3-4: There is no evidence of disc bulge or focal herniation. There is no spinal

or foraminal stenosis.

C4-5: There is minor anterolisthesis of C4 on C5. There is a mild

circumferential disc bulge. There is minimal effacement of the anterior thecal

sac. No significant spinal or foraminal stenosis is felt to be present. C5-6

:There are no disc bulges or focal herniations. There is no spinal or foraminal

stenosis.

C6-7: There is no evidence of disc bulge or focal herniation. There is no

evidence of spinal or foraminal stenosis.

C7-T1: There is no evidence of disc bulge or focal herniation. There is no

evidence of spinal or foraminal stenosis.



The study is degraded by patient motion artifact.





IMPRESSION:

1. Study mildly degraded by patient motion

2. Mild anterolisthesis of C4 on C5

3. Mild C4-5 disc bulge.

4. No evidence of significant spinal or foraminal stenosis







Electronically signed by:  Blade Marie M.D.

3/5/2018 2:45 PM



Dictated Date/Time:  3/5/2018 2:41 PM

## 2018-04-09 ENCOUNTER — HOSPITAL ENCOUNTER (EMERGENCY)
Dept: HOSPITAL 45 - C.EDB | Age: 68
Discharge: HOME | End: 2018-04-09
Payer: COMMERCIAL

## 2018-04-09 VITALS
HEIGHT: 60.98 IN | BODY MASS INDEX: 22.56 KG/M2 | WEIGHT: 119.49 LBS | BODY MASS INDEX: 22.56 KG/M2 | WEIGHT: 119.49 LBS | HEIGHT: 60.98 IN

## 2018-04-09 VITALS — TEMPERATURE: 98.24 F

## 2018-04-09 VITALS — HEART RATE: 76 BPM | SYSTOLIC BLOOD PRESSURE: 146 MMHG | DIASTOLIC BLOOD PRESSURE: 81 MMHG | OXYGEN SATURATION: 97 %

## 2018-04-09 DIAGNOSIS — M25.512: Primary | ICD-10-CM

## 2018-04-09 DIAGNOSIS — K21.9: ICD-10-CM

## 2018-04-09 DIAGNOSIS — Z79.899: ICD-10-CM

## 2018-04-09 DIAGNOSIS — Z88.0: ICD-10-CM

## 2018-04-09 DIAGNOSIS — Z88.2: ICD-10-CM

## 2018-04-09 DIAGNOSIS — N32.81: ICD-10-CM

## 2018-04-09 DIAGNOSIS — Z98.51: ICD-10-CM

## 2018-04-09 DIAGNOSIS — Z88.1: ICD-10-CM

## 2018-04-09 DIAGNOSIS — Z88.5: ICD-10-CM

## 2018-04-09 NOTE — DIAGNOSTIC IMAGING REPORT
L SHOULDER MIN 2 VIEWS ROUTINE



HISTORY:  67 years-old Female L shoulder pain acute left shoulder pain



COMPARISON: None available



TECHNIQUE: 3 views of the left shoulder



FINDINGS: 

Mild AC joint and glenohumeral degenerative changes. No acute fracture or

dislocation. Imaged lung fields appear clear.



IMPRESSION: No acute fracture or dislocation. 







The above report was generated using voice recognition software. It may contain

grammatical, syntax or spelling errors.







Electronically signed by:  Elie Boogie M.D.

4/9/2018 4:16 PM



Dictated Date/Time:  4/9/2018 4:14 PM

## 2018-04-10 NOTE — EMERGENCY ROOM VISIT NOTE
ED Visit Note


First contact with patient:  15:14


Chief Complaint: Left shoulder pain.





History of Present Illness: Ms. Ortiz is a 67-year-old white female who 

ambulates into the ED complaining of anterior shoulder pain.


Historically patient reports she has been having ongoing cervical spine and 

left shoulder pain since January of this year.  She was seen once in the 

emergency department and x-rays were performed of her cervical spine and were 

negative.  She has followed up with Dr. Caballero, orthopedic spine specialist, 

and had an MRI of her cervical spine performed which was negative and she is 

followed up with her PCP and had an EMG performed that was also negative.  Last 

week while at her PCPs office she reports that she received an injection of 

steroids into her shoulder and was given a prescription for tramadol for home 

use and has had no relief of her discomfort.


Currently she is complaining of a deep achy sensation over the anterior aspect 

of the left shoulder including the acromioclavicular joint, the humeral head 

and in the bicipital groove area.  She rates her discomfort 8/10.  She reports 

initially her pain was located just in her shoulder but now it is gradually 

gone down throughout the anterior upper arm and is now extending down into the 

forearm.  Her pain worsens with palpation and minimally with all active 

movements of the shoulder.  She reports she has had minimal relief with the use 

of hot packs but has had no relief from ice, ibuprofen, tramadol and has 

previously noted her steroid injections.  Associated with her pain she reports 

she has a decrease in strength at the level of the shoulder.


She denies any associated symptoms including fevers, chills, sweats, skin 

eruptions, worsening neck pain, chest pain, shortness of breath, upper 

respiratory tract symptoms, extremity numbness/tingling, decreased appetite.  

Additionally she denies any previous significant surgeries on the left shoulder.


Additionally it should be noted that the patient does report she has had 

previous right shoulder surgery for repair of a rotator cuff injury and she 

feels that this pain is similar.  She also reports she talked to her insurance 

company and reports that she got verbal approval for an MRI of the shoulder.





Review of Systems: As noted above in history of present illness. 8 body systems 

were reviewed and found to be negative as noted above.





Past Medical History: GERD, overactive bladder, status post appendectomy, tubal 

ligation, sinus surgery, right shoulder rotator cuff injury repair.


Current Medications:








 Medications  Dose


 Route/Sig


 Max Daily Dose Days Date Category Dose


Instructions


 


 Ultram (Tramadol


 HCl) 50 Mg Tab  1 Tab


 PO TID PRN   2/16/18 Rx  for initial treatment


 


 Riboflavin 400 Mg


 Tab  400 Mg


 PO DAILY   2/16/18 Reported 


 


 Tylenol


  (Acetaminophen)


 500 Mg Tab  1,000 Mg


 PO Q6 PRN


    2/16/18 Reported 


 


 Mag-Ox (Magnesium


 Oxide) 400 Mg Tab  400 Mg


 PO DAILY   2/16/18 Reported 


 


 Flonase Allergy


 Relief


  (Fluticasone


 Propionate


  (Nasal)) 50


 Mcg/Act Spr  2 Sprays


 MCKAYLA QAM


    2/16/18 Reported 


 


 Multivitamin


  (Multivitamins) Tab  1 Tab


 PO QAM   3/9/17 Reported 


 


 Glucosamine Msm


 Complex


  (Glucosamine-Msm-Vit


 C-Manganes) 1 Tab


 Tab  1 Tab


 PO QAM


    3/9/17 Reported 


 


 Potassium


 Gluconate 595 Mg Tab  1 Tab


 PO QAM   3/9/17 Reported 


 


 Motrin


  (Ibuprofen) 800 Mg Tab  800 Mg


 PO DAILY PRN   3/9/17 Reported 


 


 Tums (Calcium


 Carbonate) 500 Mg


 Chew  1 Tab


 PO DAILY PRN


    3/9/17 Reported 


 


 D 2000


  (Cholecalciferol)


 2,000 Unit Tab  1 Tab


 PO QAM


    3/9/17 Reported 


 


 Imitrex


  (Sumatriptan


 Succinate) 100 Mg Tab  1 Tab


 PO UD PRN


    3/9/17 Reported 


 


 Singulair


  (Montelukast


 Sodium) 10 Mg Tab  10 Mg


 PO HS


    4/20/15 Reported 


 


 Ascorbic Acid 500


 Mg Tab  500 Mg


 PO QAM   7/26/14 Reported 


 


 Prilosec


  (Omeprazole) 40


 Mg Cap  40 Mg


 PO QAM


    7/26/14 Reported 


 


 Ativan


  (Lorazepam) 1 Mg Tab  1 Mg


 PO BID PRN   7/26/14 Reported 








Allergies to Medications: Amoxicillin, cephalexin, hydrocodone, oxycodone, 

penicillin, sulfa.


Social History: Patient is not employed; she feels safe in her home environment

; she denies tobacco use and admits to alcohol use.





Physical Examination:


Vital Signs: 








  Date Time  Temp Pulse Resp B/P (MAP) Pulse Ox O2 Delivery O2 Flow Rate FiO2


 


4/9/18 17:20  76 16 146/81 97   


 


4/9/18 15:07 36.8 81 16 151/86 97 Room Air  





GENERAL: 67-year-old female in mild distress due to pain, nontoxic-appearing, 

afebrile and hemodynamically stable.


NEUROLOGICAL: Awake, alert and oriented to person, place and time.  Answering 

questions appropriately and following commands.  Normal gait.  Good hand eye 

coordination.  No focal motor sensory deficits.


SKIN: Warm, dry and pink.  No soft tissue eruptions or trauma noted.


HEENT:  Atraumatic and normocephalic.  


BACK: Mild tenderness diffusely throughout the cervical spine and para 

musculature without muscle spasm, bony deformity, bony crepitus, swelling or 

ecchymosis.  Full range of motion of the cervical spine.    


THORAX:  Lungs sounds are clear to auscultation and equal bilaterally with 

symmetrical chest wall.  


HEART:  Regular rate and rhythm.  No gallops, rubs or murmurs are appreciated.


ABDOMEN: Flat, soft and nontender.  Positive bowel sounds in all quadrants.  No 

guarding, rigidity or organomegaly.


LEFT UPPER EXTREMITY: No gross bony deformity.  Moderate tenderness over the 

acromioclavicular joint, the anterior humeral head, the area associated with 

the bicipital groove, throughout the mid and distal humerus and the proximal 

forearm.  There is no bony deformity or crepitus.  Patient has full range of 

motion in all movements of the shoulder with a slight increase in pain with 

extension and abduction, full range of motion of the elbow and forearm and 

wrist.  2+ bicipital deep tendon reflexes and equal bilaterally.  4/5 muscle 

strength in all movements of the shoulder, elbow, wrist and forearm; her 

strength feels slightly decreased when compared to the right.  Distal pulses 

are intact and capillary refill was brisk.  She is able to distinguish light 

sensations through all dermatomes of the arm and hand.





ED Course:


Patient is assessed as noted above.


Patient's medication list was reviewed.


Left Shoulder X-Rays: Were read by myself and the radiologist showing no acute 

fractures or dislocations.  Radiologist notes mild acromioclavicular joint and 

glenohumeral joint degenerative changes.


Patient was given 6 mg of morphine IM and 4 mg of Zofran ODT for her symptoms.


EKG: Was read by myself and reviewed with Dr. Weller; shows sinus rhythm with 

a shortened MD interval and nonspecific T-wave abnormalities.  This was 

compared to a previous and shows T-wave inversion now evident in the inferior 

leads and the amplitude of the T-wave in the lateral leads have increased.


Patient was placed in arm sling.


Patient was educated about today's findings and instructed on her treatment plan

; she verbalized understanding and agreement with this plan.





Clinical Impression: Anterior left shoulder pain.





Decision-Making: Initially my differential diagnosis I considered worsening 

degenerative changes, acromioclavicular joint separation, bicipital tendinitis, 

bicipital tendon rupture, glenoid humeral fracture/subluxation, cervical spine 

radiculopathy and other causes.





Disposition: Patient discharged home in stable condition accompanied by her 

; prior to departure she was reassessed and subjectively reported she 

was feeling better and rated her discomfort 6/10.





Plan:


Comfort measures were discussed with the patient including rest, heat and ice 

use, sling use and she was placed on a sliding pain medication scale of 

ibuprofen, acetaminophen and/or Percocet; her name was checked on the state 

database and no red flags were noted and she was given appropriate narcotic 

precautions.


Additionally it should be noted I queried the patient on what her allergic 

reaction symptoms were to oxycodone and she reported itchy skin and she never 

reported any hives, oral swelling or shortness of breath.  She was instructed 

to take 25-50 mg of Benadryl before each use of Percocet and come to the 

hospital immediately for any signs of allergic reaction.


Patient was encouraged to keep her upcoming family appointment an orthopedic 

appointment for definitive care and treatment.  She was encouraged to contact 

her PCP and seek help in arranging an outpatient MRI for her upcoming 

orthopedic appointment next week.


Patient was encouraged return to the ED for worsening/uncontrolled pain, 

uncontrolled swelling, arm weakness/numbness/tingling or any new/concerning 

symptoms.

## 2018-04-30 ENCOUNTER — HOSPITAL ENCOUNTER (OUTPATIENT)
Dept: HOSPITAL 45 - C.LAB | Age: 68
Discharge: HOME | End: 2018-04-30
Attending: ORTHOPAEDIC SURGERY
Payer: COMMERCIAL

## 2018-04-30 DIAGNOSIS — M75.80: ICD-10-CM

## 2018-04-30 DIAGNOSIS — Z01.812: Primary | ICD-10-CM

## 2018-04-30 LAB
BASOPHILS # BLD: 0.01 K/UL (ref 0–0.2)
BASOPHILS NFR BLD: 0.1 %
BUN SERPL-MCNC: 14 MG/DL (ref 7–18)
CALCIUM SERPL-MCNC: 9 MG/DL (ref 8.5–10.1)
CO2 SERPL-SCNC: 34 MMOL/L (ref 21–32)
CREAT SERPL-MCNC: 0.82 MG/DL (ref 0.6–1.2)
EOS ABS #: 0.04 K/UL (ref 0–0.5)
EOSINOPHIL NFR BLD AUTO: 288 K/UL (ref 130–400)
GLUCOSE SERPL-MCNC: 90 MG/DL (ref 70–99)
HCT VFR BLD CALC: 41.3 % (ref 37–47)
HGB BLD-MCNC: 14.1 G/DL (ref 12–16)
IG#: 0.01 K/UL (ref 0–0.02)
IMM GRANULOCYTES NFR BLD AUTO: 29.5 %
LYMPHOCYTES # BLD: 2 K/UL (ref 1.2–3.4)
MCH RBC QN AUTO: 32.9 PG (ref 25–34)
MCHC RBC AUTO-ENTMCNC: 34.1 G/DL (ref 32–36)
MCV RBC AUTO: 96.3 FL (ref 80–100)
MONO ABS #: 0.42 K/UL (ref 0.11–0.59)
MONOCYTES NFR BLD: 6.2 %
NEUT ABS #: 4.29 K/UL (ref 1.4–6.5)
NEUTROPHILS # BLD AUTO: 0.6 %
NEUTROPHILS NFR BLD AUTO: 63.5 %
PMV BLD AUTO: 9.1 FL (ref 7.4–10.4)
POTASSIUM SERPL-SCNC: 4 MMOL/L (ref 3.5–5.1)
RED CELL DISTRIBUTION WIDTH CV: 13.7 % (ref 11.5–14.5)
RED CELL DISTRIBUTION WIDTH SD: 48.6 FL (ref 36.4–46.3)
SODIUM SERPL-SCNC: 141 MMOL/L (ref 136–145)
WBC # BLD AUTO: 6.77 K/UL (ref 4.8–10.8)

## 2018-05-24 ENCOUNTER — HOSPITAL ENCOUNTER (OUTPATIENT)
Dept: HOSPITAL 45 - X.SURG | Age: 68
Discharge: HOME | End: 2018-05-24
Attending: ORTHOPAEDIC SURGERY
Payer: COMMERCIAL

## 2018-05-24 VITALS — HEART RATE: 87 BPM | OXYGEN SATURATION: 97 % | SYSTOLIC BLOOD PRESSURE: 126 MMHG | DIASTOLIC BLOOD PRESSURE: 77 MMHG

## 2018-05-24 VITALS
HEIGHT: 60.98 IN | BODY MASS INDEX: 21.95 KG/M2 | WEIGHT: 116.25 LBS | WEIGHT: 116.25 LBS | HEIGHT: 60.98 IN | BODY MASS INDEX: 21.95 KG/M2

## 2018-05-24 VITALS — TEMPERATURE: 97.52 F

## 2018-05-24 DIAGNOSIS — Z88.1: ICD-10-CM

## 2018-05-24 DIAGNOSIS — F41.9: ICD-10-CM

## 2018-05-24 DIAGNOSIS — M19.012: Primary | ICD-10-CM

## 2018-05-24 DIAGNOSIS — Z87.820: ICD-10-CM

## 2018-05-24 DIAGNOSIS — K21.9: ICD-10-CM

## 2018-05-24 DIAGNOSIS — M75.42: ICD-10-CM

## 2018-05-24 DIAGNOSIS — Z88.2: ICD-10-CM

## 2018-05-24 DIAGNOSIS — F32.9: ICD-10-CM

## 2018-05-24 DIAGNOSIS — M19.90: ICD-10-CM

## 2018-05-24 NOTE — [facility] POST OPERATIVE BRIEF NOTE
Immediate Operative Summary


Operative Date


May 24, 2018.





Pre-Operative Diagnosis





Left Shoulder AC joint disorder soft tissue lesion





Post-Operative Diagnosis





Same





Procedure(s) Performed





Left Shoulder Arthr;oscopy with Subacromial Decompression, Distal Clavicle 


Excision





Surgeon


Dr. SHAMIR Jaime





Assistant Surgeon(s)


AMANDA Gomez PA-C





Estimated Blood Loss


5 cc





Findings


Consistent with Post-Op Diagnosis





Specimens





None





Drains


None





Anesthesia Type


General Regional





Disposition


Disposition:  Recovery Room / PACU

## 2018-05-24 NOTE — ANESTHESIA PROGRESS NT - [facility]
Anesthesia Post Op Note


Date & Time


May 24, 2018 at 12:07





Vital Signs


Pain Intensity:  0





Vital Signs Past 12 Hours








  Date Time  Temp Pulse Resp B/P (MAP) Pulse Ox O2 Delivery O2 Flow Rate FiO2


 


5/24/18 11:51    138/77    


 


5/24/18 11:49  86 28  95   


 


5/24/18 11:49  88 28     


 


5/24/18 11:48  87 24     


 


5/24/18 11:48  86 24  96   


 


5/24/18 11:47 36.5 88 19 146/66 96 Room Air  


 


5/24/18 11:47    146/66    


 


5/24/18 11:43  94 26  97   


 


5/24/18 11:43  91 26     


 


5/24/18 11:41    151/78    


 


5/24/18 11:38  95 29     


 


5/24/18 11:38  95 29  98   


 


5/24/18 11:37  93 26     


 


5/24/18 11:37  91 26  98   


 


5/24/18 11:36    158/83    


 


5/24/18 11:32  86 23  100   


 


5/24/18 11:32  86 23     


 


5/24/18 11:31    149/79    


 


5/24/18 11:27  83 28  100   


 


5/24/18 11:27  83 28     


 


5/24/18 11:26    149/70    


 


5/24/18 11:24  82 23     


 


5/24/18 11:24  80 23 150/73 99   


 


5/24/18 11:21    149/64    


 


5/24/18 11:20  80 15 149/64 100   


 


5/24/18 11:19  81 15  99   


 


5/24/18 11:19  82 15     


 


5/24/18 11:18    143/89    


 


5/24/18 11:16 36.1 79 20 143/89 99 Mask 6 


 


5/24/18 10:23   0     


 


5/24/18 10:18  78      


 


5/24/18 10:18  75 6  99   


 


5/24/18 10:16    122/77    


 


5/24/18 10:13  66 0  100   


 


5/24/18 10:13  66      


 


5/24/18 10:12    122/48    


 


5/24/18 10:10    121/73    


 


5/24/18 10:08  76      


 


5/24/18 10:08  75 0  97   


 


5/24/18 10:03  79      


 


5/24/18 10:03  78 0  96   


 


5/24/18 09:45 36.5 75 20 142/80 (100) 97 Room Air  


 


5/24/18 09:40    142/80    











Notes


Mental Status:  alert / awake / arousable, participated in evaluation


Pt Amnestic to Procedure:  Yes


Nausea / Vomiting:  adequately controlled


Pain:  adequately controlled


Airway Patency, RR, SpO2:  stable & adequate


BP & HR:  stable & adequate


Hydration State:  stable & adequate


Anesthetic Complications:  no major complications apparent

## 2018-05-24 NOTE — DISCHARGE INSTRUCTIONS-[facility]
Discharge Instructions


Date of Service


May 24, 2018.





Visit


Reason for Visit:  Left Shoulder Ac Joint Disorder, Soft Tissue Lesio





Discharge


Discharge Diagnosis / Problem:  SAME AS ABOVE





Discharge Goals


Goal(s):  Decrease discomfort, Improve function





Medications


Stopped Medications Name(s):  


last dose of Ibuprofen seven days ago


Restart Stopped Medication(s):


MAY RESTART WHEN FINISHED WITH THE TORADOL


TAKE TORADOL EVERY 8 HOURS WITH FOOD UNTIL FINISHED





Activity Recommendations


Activity Limitations:  as noted below


Lifting Limitations:  gradually increase as tolerated


Exercise/Sports Limitations:  gradually increase as tolerated


Shower/Bathe:  tomorrow





Anesthesia


.





Post Anesthesia Instructions:





If you have had General Anesthesia or IV Sedation:





*  Do not drive today.


*  Resume driving when surgeon permits.


*  Do not make important decisions or sign legal documents today.


*  Call surgeon for:





   1.  Temperature elevations greater than 101 degrees F.


   2.  Uncontrollable pain.


   3.  Excessive bleeding.


   4.  Persistent nausea and vomiting.


   5.  Medication intolerance (nausea, vomiting or rash).





*  For nausea and vomiting use only clear liquids such as: tea, soda, bouillon 

until nausea subsides, then gradually increase diet as tolerated.





*  If you have any concerns or questions, call your surgeon's office.  If 

physician is unavailable and it is an emergency, call 911 or go to the nearest 

emergency room.





.





Instructions / Follow-Up


Instructions / Follow-Up





MEDICATIONS:





*  Resume previous medications unless instructed otherwise by your surgeon.





*  Always take pain medication on a full stomach or with food to avoid upset 

stomach. 





*  Do not drink alcohol or drive while taking narcotics.





*  Ibuprofen or Tylenol may be taken if narcotic not needed.








SPECIAL CARE INSTRUCTIONS:





__ None





_X_ Keep extremity elevated and iced x 48 hours; apply ice 20-30 


    minutes 8-10 times/day. May remove at night.





_X_ Sling (WEAR FOR COMFORT ONLY) 


    __24 hrs/day


    __ Remove at night





__ Shoulder Immobilizer


    __ 24 hrs/day


    __ Remove at night





_X_ Dressing


    __ Maintain until seen in office, may shower with plastic over site


    _X_ Remove dressings in 24-48 hours and then may shower


    _X_ Cover incisions with band-aids after showering


    __ Do not remove steri-strips





Call physician if chills or temperature rises above 102 degrees or pain


unrelieved by prescribed pain medications at [phone].


.





Diet Recommendations


Home Diet:  no limitations





Procedures


Procedures Performed:  


Left Shoulder Arthr;oscopy with Subacromial Decompression, Distal Clavicle 


Excision





Pending Studies


Studies pending at discharge:  no





Work Instructions


Return To Work:  after follow-up


Lifting Limitations:  none





Medical Emergencies








.


Who to Call and When:





Medical Emergencies:  If at any time you feel your situation is an emergency, 

please call 911 immediately.





.





Non-Emergent Contact


Non-Emergency issues call your:  Primary Care Provider


Call Non-Emergent contact if:  you have a fever, temperature is above 101.5





.


.








"Provider Documentation" section prepared by Abhi Gomez.








.

## 2018-05-24 NOTE — OPERATIVE REPORT
DATE OF OPERATION:  05/24/2018

 

PREOPERATIVE DIAGNOSES:  Severe external impingement and acromioclavicular

joint arthritis, left shoulder.

 

POSTOPERATIVE DIAGNOSES:  Same.

 

PROCEDURE:  Left shoulder diagnostic arthroscopy with limited debridement,

acromioplasty, and distal clavicle resection.

 

SURGEON:  Dr. Leroy Jaime.

 

ASSISTANT:  Abhi Gomez PA-C, whose assistance was necessary for

positioning the arm and help with instrumentation.

 

ANESTHESIA:  General with a left interscalene nerve block.

 

COMPLICATIONS:  None.

 

CONDITION:  Stable to PACU.

 

INDICATIONS:  Sanna is a very pleasant 67-year-old female who presented to my

office with a several-month history of increasing left shoulder pain.  She

did not recall any traumatic event.  MRI and clinical examination were

diagnostic for severe external impingement and AC joint arthritis.  After

failing conservative treatment, she elected to undergo arthroscopy.

 

DESCRIPTION OF PROCEDURE:  On 05/24/2018, she arrived at [facility] for the above procedure.  She was seen in the preoperative

holding and the operative extremity was identified and signed.  She was given

a preoperative antibiotic and a left interscalene nerve block.  She was taken

back to the operating room, laid on table in supine position and put under

general anesthesia.  She was then put into the beachchair position.  The left

shoulder was prepped and draped in sterile fashion.  Time-out was done.  The

patient's operative extremity was properly identified.

 

A scope was introduced in the posterior portal.  Diagnostic arthroscopy

showed no cartilage damage to the humeral head or the glenoid.  There was a

little fraying of the anterior labrum.  The biceps tendon was intact and went

through a normal size biceps pulley mechanism.  The supraspinatus,

infraspinatus, teres minor and subscapularis were all checked and intact.  An

anterior portal was made.  A shaver was used to do a limited debridement of

the intraarticular structures.  The biceps tendon was pulled into the joint

and there was no evidence of pathology.  The scope was then put into the

subacromial space.  There was significant amount of very red and inflamed

bursa.  A lateral portal was made.  A shaver was used to do a complete

subacromial and subdeltoid bursectomy.  An ablator was used to tease the

coracoacromial ligament off the undersurface of the acromion and a 5-0 josh

was used to complete an acromioplasty of a Bigliani type 3 acromion.  A

shaver was used to remove any excess debris and the bursal side of the

rotator cuff was examined extensively without evidence of tear.  Attention

was then turned to the distal clavicle.  Through an anterior portal, a shaver

and ablator were used to skeletonize the distal clavicle.  A 5-0 josh was

then used to resect the distal 5 mm in the clavicle.  Complete resection was

checked under direct visualization.  Final diagnostic arthroscopy showed no

additional pathology.  Arthroscopic instruments were removed from the

shoulder.  Portal sites were closed with 3-0 nylon.  She was then placed in a

soft dressing and regular arm sling.  She was then extubated, transferred to

a litter and taken to postanesthesia care unit in stable condition.  She

tolerated the procedure well.

 

 

I attest to the content of the Intraoperative Record and any orders documented therein. Any exception
s are noted below.

## 2025-06-26 NOTE — HISTORY & PHYSICAL BRIDGE - [facility]
H&P Re-Evaluation


Bridge Note:


I have examined the patient, reviewed the History & Physical and in the 

interval since the performance of the History & Physical I have noted the 

following changes of clinical significance: No changes noted no